# Patient Record
Sex: FEMALE | HISPANIC OR LATINO | Employment: UNEMPLOYED | URBAN - METROPOLITAN AREA
[De-identification: names, ages, dates, MRNs, and addresses within clinical notes are randomized per-mention and may not be internally consistent; named-entity substitution may affect disease eponyms.]

---

## 2022-07-20 ENCOUNTER — TRANSCRIBE ORDERS (OUTPATIENT)
Dept: MAMMOGRAPHY | Facility: HOSPITAL | Age: 47
End: 2022-07-20

## 2022-07-20 DIAGNOSIS — Z12.31 SCREENING MAMMOGRAM, ENCOUNTER FOR: Primary | ICD-10-CM

## 2024-05-30 ENCOUNTER — HOSPITAL ENCOUNTER (EMERGENCY)
Facility: HOSPITAL | Age: 49
Discharge: HOME | End: 2024-05-30
Attending: STUDENT IN AN ORGANIZED HEALTH CARE EDUCATION/TRAINING PROGRAM
Payer: OTHER MISCELLANEOUS

## 2024-05-30 ENCOUNTER — APPOINTMENT (OUTPATIENT)
Dept: GENERAL RADIOLOGY | Facility: HOSPITAL | Age: 49
End: 2024-05-30
Payer: OTHER MISCELLANEOUS

## 2024-05-30 ENCOUNTER — APPOINTMENT (OUTPATIENT)
Dept: CT IMAGING | Facility: HOSPITAL | Age: 49
End: 2024-05-30
Payer: OTHER MISCELLANEOUS

## 2024-05-30 VITALS
BODY MASS INDEX: 33.49 KG/M2 | TEMPERATURE: 98.8 F | WEIGHT: 182 LBS | DIASTOLIC BLOOD PRESSURE: 88 MMHG | OXYGEN SATURATION: 100 % | HEART RATE: 62 BPM | RESPIRATION RATE: 16 BRPM | HEIGHT: 62 IN | SYSTOLIC BLOOD PRESSURE: 139 MMHG

## 2024-05-30 DIAGNOSIS — M79.641 PAIN OF RIGHT HAND: ICD-10-CM
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
## 2024-05-30 DIAGNOSIS — H11.31 CONJUNCTIVAL HEMORRHAGE OF RIGHT EYE: ICD-10-CM

## 2024-05-30 DIAGNOSIS — R07.89 CHEST WALL PAIN: ICD-10-CM

## 2024-05-30 LAB
ALBUMIN SERPL-MCNC: 4.3 G/DL (ref 3.5–5.2)
ALBUMIN/GLOB SERPL: 1.5 G/DL
ALP SERPL-CCNC: 91 U/L (ref 39–117)
ALT SERPL W P-5'-P-CCNC: 12 U/L (ref 1–33)
ANION GAP SERPL CALCULATED.3IONS-SCNC: 7 MMOL/L (ref 5–15)
AST SERPL-CCNC: 11 U/L (ref 1–32)
BASOPHILS # BLD AUTO: 0.03 10*3/MM3 (ref 0–0.2)
BASOPHILS NFR BLD AUTO: 0.4 % (ref 0–1.5)
BILIRUB SERPL-MCNC: 0.3 MG/DL (ref 0–1.2)
BUN SERPL-MCNC: 12 MG/DL (ref 6–20)
BUN/CREAT SERPL: 18.5 (ref 7–25)
CALCIUM SPEC-SCNC: 9.4 MG/DL (ref 8.6–10.5)
CHLORIDE SERPL-SCNC: 105 MMOL/L (ref 98–107)
CO2 SERPL-SCNC: 26 MMOL/L (ref 22–29)
CREAT SERPL-MCNC: 0.65 MG/DL (ref 0.57–1)
DEPRECATED RDW RBC AUTO: 40.6 FL (ref 37–54)
EGFRCR SERPLBLD CKD-EPI 2021: 109.4 ML/MIN/1.73
EOSINOPHIL # BLD AUTO: 0.18 10*3/MM3 (ref 0–0.4)
EOSINOPHIL NFR BLD AUTO: 2.6 % (ref 0.3–6.2)
ERYTHROCYTE [DISTWIDTH] IN BLOOD BY AUTOMATED COUNT: 13.8 % (ref 12.3–15.4)
GLOBULIN UR ELPH-MCNC: 2.8 GM/DL
GLUCOSE SERPL-MCNC: 91 MG/DL (ref 65–99)
HCG SERPL QL: NEGATIVE
HCT VFR BLD AUTO: 38.7 % (ref 34–46.6)
HGB BLD-MCNC: 12.6 G/DL (ref 12–15.9)
IMM GRANULOCYTES # BLD AUTO: 0.03 10*3/MM3 (ref 0–0.05)
IMM GRANULOCYTES NFR BLD AUTO: 0.4 % (ref 0–0.5)
LIPASE SERPL-CCNC: 34 U/L (ref 13–60)
LYMPHOCYTES # BLD AUTO: 1.58 10*3/MM3 (ref 0.7–3.1)
LYMPHOCYTES NFR BLD AUTO: 23.2 % (ref 19.6–45.3)
MCH RBC QN AUTO: 26.3 PG (ref 26.6–33)
MCHC RBC AUTO-ENTMCNC: 32.6 G/DL (ref 31.5–35.7)
MCV RBC AUTO: 80.8 FL (ref 79–97)
MONOCYTES # BLD AUTO: 0.42 10*3/MM3 (ref 0.1–0.9)
MONOCYTES NFR BLD AUTO: 6.2 % (ref 5–12)
NEUTROPHILS NFR BLD AUTO: 4.57 10*3/MM3 (ref 1.7–7)
NEUTROPHILS NFR BLD AUTO: 67.2 % (ref 42.7–76)
NRBC BLD AUTO-RTO: 0 /100 WBC (ref 0–0.2)
PLATELET # BLD AUTO: 312 10*3/MM3 (ref 140–450)
PMV BLD AUTO: 9.7 FL (ref 6–12)
POTASSIUM SERPL-SCNC: 4.1 MMOL/L (ref 3.5–5.2)
PROT SERPL-MCNC: 7.1 G/DL (ref 6–8.5)
RBC # BLD AUTO: 4.79 10*6/MM3 (ref 3.77–5.28)
SODIUM SERPL-SCNC: 138 MMOL/L (ref 136–145)
WBC NRBC COR # BLD AUTO: 6.81 10*3/MM3 (ref 3.4–10.8)

## 2024-05-30 PROCEDURE — 70450 CT HEAD/BRAIN W/O DYE: CPT

## 2024-05-30 PROCEDURE — 80053 COMPREHEN METABOLIC PANEL: CPT | Performed by: STUDENT IN AN ORGANIZED HEALTH CARE EDUCATION/TRAINING PROGRAM

## 2024-05-30 PROCEDURE — 83690 ASSAY OF LIPASE: CPT | Performed by: STUDENT IN AN ORGANIZED HEALTH CARE EDUCATION/TRAINING PROGRAM

## 2024-05-30 PROCEDURE — 84703 CHORIONIC GONADOTROPIN ASSAY: CPT | Performed by: STUDENT IN AN ORGANIZED HEALTH CARE EDUCATION/TRAINING PROGRAM

## 2024-05-30 PROCEDURE — 85025 COMPLETE CBC W/AUTO DIFF WBC: CPT | Performed by: STUDENT IN AN ORGANIZED HEALTH CARE EDUCATION/TRAINING PROGRAM

## 2024-05-30 PROCEDURE — 71250 CT THORAX DX C-: CPT

## 2024-05-30 PROCEDURE — 99284 EMERGENCY DEPT VISIT MOD MDM: CPT

## 2024-05-30 PROCEDURE — 73130 X-RAY EXAM OF HAND: CPT

## 2024-05-30 RX ORDER — ACETAMINOPHEN 500 MG
1000 TABLET ORAL ONCE
Status: COMPLETED | OUTPATIENT
Start: 2024-05-30 | End: 2024-05-30

## 2024-05-30 RX ADMIN — ACETAMINOPHEN 1000 MG: 500 TABLET ORAL at 09:57
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
## 2025-07-24 ENCOUNTER — APPOINTMENT (OUTPATIENT)
Dept: CT IMAGING | Facility: HOSPITAL | Age: 50
End: 2025-07-24

## 2025-07-24 ENCOUNTER — APPOINTMENT (OUTPATIENT)
Dept: GENERAL RADIOLOGY | Facility: HOSPITAL | Age: 50
End: 2025-07-24

## 2025-07-24 ENCOUNTER — HOSPITAL ENCOUNTER (EMERGENCY)
Facility: HOSPITAL | Age: 50
Discharge: HOME | End: 2025-07-24
Attending: STUDENT IN AN ORGANIZED HEALTH CARE EDUCATION/TRAINING PROGRAM

## 2025-07-24 VITALS
SYSTOLIC BLOOD PRESSURE: 138 MMHG | OXYGEN SATURATION: 97 % | RESPIRATION RATE: 18 BRPM | DIASTOLIC BLOOD PRESSURE: 83 MMHG | HEART RATE: 74 BPM | BODY MASS INDEX: 32.92 KG/M2 | TEMPERATURE: 98.3 F | WEIGHT: 180 LBS

## 2025-07-24 DIAGNOSIS — N20.0 RENAL STONE: Primary | ICD-10-CM

## 2025-07-24 LAB
ALBUMIN SERPL-MCNC: 4.1 G/DL (ref 3.5–5.2)
ALBUMIN/GLOB SERPL: 1.3 G/DL
ALP SERPL-CCNC: 78 U/L (ref 39–117)
ALT SERPL W P-5'-P-CCNC: 20 U/L (ref 1–33)
ANION GAP SERPL CALCULATED.3IONS-SCNC: 8.8 MMOL/L (ref 5–15)
AST SERPL-CCNC: 17 U/L (ref 1–32)
B-HCG UR QL: NEGATIVE
BASOPHILS # BLD AUTO: 0.02 10*3/MM3 (ref 0–0.2)
BASOPHILS NFR BLD AUTO: 0.3 % (ref 0–1.5)
BILIRUB SERPL-MCNC: 0.3 MG/DL (ref 0–1.2)
BILIRUB UR QL STRIP: NEGATIVE
BUN SERPL-MCNC: 13.1 MG/DL (ref 6–20)
BUN/CREAT SERPL: 20.2 (ref 7–25)
CALCIUM SPEC-SCNC: 9.1 MG/DL (ref 8.6–10.5)
CHLORIDE SERPL-SCNC: 103 MMOL/L (ref 98–107)
CLARITY UR: CLEAR
CO2 SERPL-SCNC: 23.2 MMOL/L (ref 22–29)
COLOR UR: YELLOW
CREAT SERPL-MCNC: 0.65 MG/DL (ref 0.57–1)
DEPRECATED RDW RBC AUTO: 39.9 FL (ref 37–54)
EGFRCR SERPLBLD CKD-EPI 2021: 108.8 ML/MIN/1.73
EOSINOPHIL # BLD AUTO: 0.1 10*3/MM3 (ref 0–0.4)
EOSINOPHIL NFR BLD AUTO: 1.4 % (ref 0.3–6.2)
ERYTHROCYTE [DISTWIDTH] IN BLOOD BY AUTOMATED COUNT: 13.7 % (ref 12.3–15.4)
GLOBULIN UR ELPH-MCNC: 3.1 GM/DL
GLUCOSE SERPL-MCNC: 94 MG/DL (ref 65–99)
GLUCOSE UR STRIP-MCNC: NEGATIVE MG/DL
HCT VFR BLD AUTO: 39.6 % (ref 34–46.6)
HGB BLD-MCNC: 13.2 G/DL (ref 12–15.9)
HGB UR QL STRIP.AUTO: NEGATIVE
IMM GRANULOCYTES # BLD AUTO: 0.02 10*3/MM3 (ref 0–0.05)
IMM GRANULOCYTES NFR BLD AUTO: 0.3 % (ref 0–0.5)
KETONES UR QL STRIP: NEGATIVE
LEUKOCYTE ESTERASE UR QL STRIP.AUTO: NEGATIVE
LIPASE SERPL-CCNC: 41 U/L (ref 13–60)
LYMPHOCYTES # BLD AUTO: 1.95 10*3/MM3 (ref 0.7–3.1)
LYMPHOCYTES NFR BLD AUTO: 27.8 % (ref 19.6–45.3)
MCH RBC QN AUTO: 26.7 PG (ref 26.6–33)
MCHC RBC AUTO-ENTMCNC: 33.3 G/DL (ref 31.5–35.7)
MCV RBC AUTO: 80 FL (ref 79–97)
MONOCYTES # BLD AUTO: 0.48 10*3/MM3 (ref 0.1–0.9)
MONOCYTES NFR BLD AUTO: 6.8 % (ref 5–12)
NEUTROPHILS NFR BLD AUTO: 4.44 10*3/MM3 (ref 1.7–7)
NEUTROPHILS NFR BLD AUTO: 63.4 % (ref 42.7–76)
NITRITE UR QL STRIP: NEGATIVE
NRBC BLD AUTO-RTO: 0 /100 WBC (ref 0–0.2)
PH UR STRIP.AUTO: <=5 [PH] (ref 4.5–8)
PLATELET # BLD AUTO: 276 10*3/MM3 (ref 140–450)
PMV BLD AUTO: 9.9 FL (ref 6–12)
POTASSIUM SERPL-SCNC: 3.7 MMOL/L (ref 3.5–5.2)
PROT SERPL-MCNC: 7.2 G/DL (ref 6–8.5)
PROT UR QL STRIP: NEGATIVE
RBC # BLD AUTO: 4.95 10*6/MM3 (ref 3.77–5.28)
SODIUM SERPL-SCNC: 135 MMOL/L (ref 136–145)
SP GR UR STRIP: 1.02 (ref 1–1.03)
UROBILINOGEN UR QL STRIP: NORMAL
WBC NRBC COR # BLD AUTO: 7.01 10*3/MM3 (ref 3.4–10.8)

## 2025-07-24 PROCEDURE — 81025 URINE PREGNANCY TEST: CPT

## 2025-07-24 PROCEDURE — 83690 ASSAY OF LIPASE: CPT

## 2025-07-24 PROCEDURE — 99285 EMERGENCY DEPT VISIT HI MDM: CPT | Performed by: STUDENT IN AN ORGANIZED HEALTH CARE EDUCATION/TRAINING PROGRAM

## 2025-07-24 PROCEDURE — 25510000001 IOPAMIDOL PER 1 ML: Performed by: STUDENT IN AN ORGANIZED HEALTH CARE EDUCATION/TRAINING PROGRAM

## 2025-07-24 PROCEDURE — 96375 TX/PRO/DX INJ NEW DRUG ADDON: CPT

## 2025-07-24 PROCEDURE — 25010000002 ONDANSETRON PER 1 MG

## 2025-07-24 PROCEDURE — 96361 HYDRATE IV INFUSION ADD-ON: CPT

## 2025-07-24 PROCEDURE — 96374 THER/PROPH/DIAG INJ IV PUSH: CPT

## 2025-07-24 PROCEDURE — 71045 X-RAY EXAM CHEST 1 VIEW: CPT

## 2025-07-24 PROCEDURE — 25010000002 KETOROLAC TROMETHAMINE PER 15 MG

## 2025-07-24 PROCEDURE — 25810000003 SODIUM CHLORIDE 0.9 % SOLUTION

## 2025-07-24 PROCEDURE — 80053 COMPREHEN METABOLIC PANEL: CPT

## 2025-07-24 PROCEDURE — 74177 CT ABD & PELVIS W/CONTRAST: CPT

## 2025-07-24 PROCEDURE — 81003 URINALYSIS AUTO W/O SCOPE: CPT

## 2025-07-24 PROCEDURE — 85025 COMPLETE CBC W/AUTO DIFF WBC: CPT

## 2025-07-24 RX ORDER — ONDANSETRON 2 MG/ML
4 INJECTION INTRAMUSCULAR; INTRAVENOUS ONCE
Status: COMPLETED | OUTPATIENT
Start: 2025-07-24 | End: 2025-07-24

## 2025-07-24 RX ORDER — NAPROXEN 500 MG/1
500 TABLET ORAL 2 TIMES DAILY PRN
Qty: 28 TABLET | Refills: 0 | Status: SHIPPED | OUTPATIENT
Start: 2025-07-24 | End: 2025-08-07

## 2025-07-24 RX ORDER — SODIUM CHLORIDE 0.9 % (FLUSH) 0.9 %
10 SYRINGE (ML) INJECTION AS NEEDED
Status: DISCONTINUED | OUTPATIENT
Start: 2025-07-24 | End: 2025-07-24 | Stop reason: HOSPADM

## 2025-07-24 RX ORDER — KETOROLAC TROMETHAMINE 30 MG/ML
15 INJECTION, SOLUTION INTRAMUSCULAR; INTRAVENOUS ONCE
Status: COMPLETED | OUTPATIENT
Start: 2025-07-24 | End: 2025-07-24

## 2025-07-24 RX ORDER — IOPAMIDOL 755 MG/ML
100 INJECTION, SOLUTION INTRAVASCULAR
Status: COMPLETED | OUTPATIENT
Start: 2025-07-24 | End: 2025-07-24

## 2025-07-24 RX ADMIN — KETOROLAC TROMETHAMINE 15 MG: 30 INJECTION INTRAMUSCULAR; INTRAVENOUS at 13:01

## 2025-07-24 RX ADMIN — SODIUM CHLORIDE 1000 ML: 9 INJECTION, SOLUTION INTRAVENOUS at 13:00

## 2025-07-24 RX ADMIN — ONDANSETRON 4 MG: 2 INJECTION, SOLUTION INTRAMUSCULAR; INTRAVENOUS at 13:01

## 2025-07-24 RX ADMIN — IOPAMIDOL 100 ML: 755 INJECTION, SOLUTION INTRAVENOUS at 13:22

## 2025-07-24 NOTE — Clinical Note
[facility] EMERGENCY DEPARTMENT  [address]  [place] [address]  Phone: [phone]    Estrellita Lea was seen and treated in our emergency department on 7/24/2025.  She may return to work on 07/25/2025.         Thank you for choosing Psych[facility].    Gutierrez Hooker MD

## 2025-07-24 NOTE — ED PROVIDER NOTES
CHIEF CONCERN   Chief Complaint   Patient presents with    Abdominal Pain       Subjective     History of Present Illness  This is a female with a history of right upper quadrant pain presenting with exacerbation of symptoms.    The patient has been experiencing persistent right upper quadrant pain for several years, which has recently intensified. The pain is described as severe enough to induce nausea and vomiting, with one episode of vomiting occurring yesterday. She reports no presence of blood in her vomit. She also reports back pain but does not experience any burning sensation during urination. However, she does report occasional increased frequency of urination. She is uncertain about any history of kidney stones.     Her menstrual cycle is regular, with the first day of her last period being 06/28/2025. She maintains a healthy diet and reports no known drug allergies. She reports no chest pain or shortness of breath during physical activity, diarrhea, constipation, vaginal discharge, pelvic pain. She has been managing her pain with ibuprofen.    Review of Systems   Constitutional:  Negative for chills and fever.   Respiratory:  Negative for cough and shortness of breath.    Gastrointestinal:  Negative for constipation, diarrhea, nausea, rectal pain and vomiting.   Genitourinary:  Positive for frequency. Negative for dysuria, pelvic pain, vaginal bleeding, vaginal discharge and vaginal pain.       History reviewed. No pertinent past medical history.    History reviewed. No pertinent surgical history.    History reviewed. No pertinent family history.    Social History     Socioeconomic History    Marital status:    Tobacco Use    Smoking status: Never    Smokeless tobacco: Never   Substance and Sexual Activity    Alcohol use: Not Currently    Drug use: Never       No Known Allergies    No current facility-administered medications on file prior to encounter.     No current outpatient medications on file  prior to encounter.       /83 (BP Location: Right arm, Patient Position: Sitting)   Pulse 74   Temp 98.3 °F (36.8 °C) (Oral)   Resp 18   Wt 81.6 kg (180 lb)   LMP 06/28/2025   SpO2 97%   BMI 32.92 kg/m²     Objective     Physical Exam  Vitals and nursing note reviewed.   Constitutional:       General: She is not in acute distress.     Appearance: She is not ill-appearing, toxic-appearing or diaphoretic.   HENT:      Head: Normocephalic and atraumatic.   Cardiovascular:      Rate and Rhythm: Normal rate.   Pulmonary:      Effort: Pulmonary effort is normal. No respiratory distress.      Breath sounds: Normal breath sounds. No wheezing.   Abdominal:      General: Bowel sounds are normal.      Palpations: Abdomen is soft.      Tenderness: There is abdominal tenderness. There is right CVA tenderness. There is no left CVA tenderness.   Musculoskeletal:         General: No swelling or tenderness. Normal range of motion.      Cervical back: Neck supple.      Right lower leg: No edema.      Left lower leg: No edema.   Skin:     General: Skin is warm and dry.      Capillary Refill: Capillary refill takes less than 2 seconds.   Neurological:      General: No focal deficit present.      Mental Status: She is alert and oriented to person, place, and time.   Psychiatric:         Mood and Affect: Mood normal.         Behavior: Behavior normal. Behavior is cooperative.         Procedures         ED Course  ED Course as of 07/25/25 1415   Thu Jul 24, 2025   1424 CT without acute pathology and labs reassuring. Patient resting comfortably. Patient states she feels much better and would like to go home.Patient reports improvement of symptoms after receiving Toradol.  She is aware that her CT is unremarkable for any gallbladder disease at this time, white count was negative.  Patient states that she noticed the pain only after eating certain foods.  Encouraged to avoid heavy greasy food until she is reevaluated by her PCP.   Patient requesting to have some ibuprofen sent to her pharmacy.  We discussed the limitations of CT imaging and that no test is perfect at finding all abnormalities. I discussed that the overall picture at this point is of a likely functional rather than surgical or infectious process. She understood. Given the location of her pain and the overall clinical picture, I think torsion, toa, or other significant pelvic process is highly unlikely and do not think US is necessary at this time.  Return precautions were extensively discussed and shew ill see their primary within the next 48hours or to return to the ER for a re-exam. The patient understands to return for any worsening symptoms or failure to improve, especially over the next 12-24 hours.    [SN]      ED Course User Index  [SN] Hussein John APRN       Labs:       []Abnormal Only     Lipase      Final resultCollected 07/24 12:50  Lipase 41   Urinalysis With Microscopic If Indicated (No Culture) - Urine, Clean Catch    Final resultCollected 07/24 12:50  Color, UA Yellow   Appearance, UA Clear   pH, UA <=5.0   Specific Gravity, UA 1.020   Glucose Negative   Ketones, UA Negative   Bilirubin, UA Negative   Blood, UA Negative   Protein, UA Negative   Leukocytes, UA Negative   Nitrite, UA Negative   Urobilinogen, UA 0.2 E.U./dL    Comprehensive Metabolic Panel    Final resultCollected 07/24 12:50  Glucose 94   BUN 13.1   Creatinine 0.65   Sodium 135   Potassium 3.7   Chloride 103   CO2 23.2   Calcium 9.1   Total Protein 7.2   Albumin 4.1   ALT (SGPT) 20   AST (SGOT) 17   Alkaline Phosphatase 78   Total Bilirubin 0.3   Globulin 3.1   A/G Ratio 1.3   BUN/Creatinine Ratio 20.2   Anion Gap 8.8   eGFR 108.8    Pregnancy, Urine - Urine, Clean Catch    Final resultCollected 07/24 12:50  HCG, Urine QL Negative   CBC & Differential    Final resultCollected 07/24 12:50  WBC 7.01   RBC 4.95   Hemoglobin 13.2   Hematocrit 39.6   MCV 80.0   MCH 26.7   MCHC 33.3   RDW 13.7    RDW-SD 39.9   MPV 9.9   Platelets 276   Neutrophil Rel % 63.4   Lymphocyte Rel % 27.8   Monocyte Rel % 6.8   Eosinophil Rel % 1.4   Basophil Rel % 0.3   Immature Granulocyte Rel % 0.3   Neutrophils Absolute 4.44   Lymphocytes Absolute 1.95   Monocytes Absolute 0.48   Eosinophils Absolute 0.10   Basophils Absolute 0.02   Immature Grans, Absolute 0.02   nRBC 0.0          CT Abdomen Pelvis With Contrast  Result Date: 7/24/2025  Impression: Punctate nonobstructing right renal stone. Small stone within the urinary bladder. No left renal stone. No urinary tract obstruction. Electronically Signed: Kiran Guaman MD  7/24/2025 1:48 PM EDT  Workstation ID: [id number]    XR Chest 1 View  Result Date: 7/24/2025  Impression: Normal chest. Electronically Signed: Jt Garcia MD  7/24/2025 1:43 PM EDT  Workstation ID: [id number]             Medical Decision Making    Patient is 49 years old  female with Right upper quadrant pain for several years, recently worsened. Nausea and one episode of vomiting in the past two days. No history of kidney stones, blood in emesis, or urinary frequency. Previously seen at [facility], scan suggested.  -VS unremarkable  -Overall  not sick appearing. No abnormal cardiopulmonary findings. Moderate abdominal ttp. No guarding. Surgical process seems possible given the exam and presentation. History and exam doesn't suggest AAA.No evidence of infection at this point so holding ABX for now. Could be a benign cause of pain although dangerous pathology needs to be ruled out first. No evidence of suprapubic/adnexal area tenderness and no report of discharge so PID/TOA seems unlikely. Even so, CT is sensitive enough to likely  TOA. Ectopic always a possibility.      Differential Diagnosis:  - Cholecystitis: Right upper quadrant pain, nausea, vomiting. Plan: Blood work, x-ray to check liver.  - Hepatitis: Right upper quadrant pain. Plan: Blood work, x-ray to rule out pneumonia considering  exacerbation. Less likely due to lack of systemic evidence.  - Peptic ulcer disease: Right upper quadrant pain. Plan: Blood work.  - Pyonephritis :   - Nephrolithiasis      Ed course : labs / Xray/ CT/ IV fluids and pain management pending results. Reassessment.      Clinical Impression:  - Cholecystitis  - Hepatitis  - Peptic ulcer disease    Disposition:  Discharge: Home. Medication provided for pain management, can take at home. Return if symptoms worsen or new symptoms develop.    Follow-Up:  Follow-up with primary care physician for further evaluation and management.    Patient Education:  Discussed pain management options. Explained importance of follow-up. Advised to return if symptoms worsen or new symptoms develop.      Problems Addressed:  Renal stone: complicated acute illness or injury    Amount and/or Complexity of Data Reviewed  Labs: ordered.  Radiology: ordered.    Risk  Prescription drug management.        Diagnoses and all orders for this visit:    1. Renal stone (Primary)    Other orders  -     CBC & Differential; Standing  -     Pregnancy, Urine - Urine, Clean Catch; Standing  -     Urinalysis With Microscopic If Indicated (No Culture) - Urine, Clean Catch; Standing  -     XR Chest 1 View; Standing  -     CT Abdomen Pelvis With Contrast; Standing  -     Comprehensive Metabolic Panel; Standing  -     Lipase; Standing  -     sodium chloride 0.9 % bolus 1,000 mL  -     ondansetron (ZOFRAN) injection 4 mg  -     ketorolac (TORADOL) injection 15 mg  -     CBC & Differential  -     Pregnancy, Urine - Urine, Clean Catch  -     Urinalysis With Microscopic If Indicated (No Culture) - Urine, Clean Catch  -     XR Chest 1 View  -     CT Abdomen Pelvis With Contrast  -     Comprehensive Metabolic Panel  -     Lipase  -     Cancel: Insert Peripheral IV; Standing  -     Discontinue: sodium chloride 0.9 % flush 10 mL  -     Cancel: Insert Peripheral IV  -     iopamidol (ISOVUE-370) 76 % injection 100 mL  -      naproxen (NAPROSYN) 500 MG tablet; Take 1 tablet by mouth 2 (Two) Times a Day As Needed for Mild Pain for up to 14 days.  Dispense: 28 tablet; Refill: 0        Final diagnoses:   Renal stone        Follow-up Information       [facility]. Schedule an appointment as soon as possible for a visit in 3 days.    Why: establish care, re-evaluation  Contact information:  [address]  [facility] [address] [phone]                           New Medications Ordered This Visit   Medications    sodium chloride 0.9 % bolus 1,000 mL    ondansetron (ZOFRAN) injection 4 mg    ketorolac (TORADOL) injection 15 mg    iopamidol (ISOVUE-370) 76 % injection 100 mL    naproxen (NAPROSYN) 500 MG tablet     Sig: Take 1 tablet by mouth 2 (Two) Times a Day As Needed for Mild Pain for up to 14 days.     Dispense:  28 tablet     Refill:  0         Patient or patient representative verbalized consent for the use of Ambient Listening during the visit for chart documentation.  Hussein John, APRN 7/25/2025 14:15 EDT    FOR FULL DISCHARGE INSTRUCTIONS/COMMENTS/HANDOUTS please see the AVS